# Patient Record
(demographics unavailable — no encounter records)

---

## 2024-10-23 NOTE — ASSESSMENT
[FreeTextEntry1] : EXAM Right wrist with mild swelling; no erythema; no ecchymosis. Mild ttp at dorsal wrist. Able to make a composite fist, oppose thumb to small finger and abduct fingers. <2sec cap refill.  Right wrist radiographs with no fracture nor dislocation. Carpus alignment intact. (3-view)  ASSESSMENT & PLAN Right wrist sprain vs contusion - reviewed radiographs and pathoanatomy with the patient. Discussed management to consist of NSAIDs prn, wrist brace prn, elevation, minimize use.   F/u prn
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
## 2025-02-26 NOTE — ASSESSMENT
[FreeTextEntry1] : EXAM Right wrist with mild swelling; no erythema; no ecchymosis. +ttp at radial styloid. Able to make a composite fist, oppose thumb to small finger and abduct fingers. <2sec cap refill.  Right wrist radiographs with no fracture nor dislocation. Carpus alignment intact. (3-view)  ASSESSMENT & PLAN Right wrist sprain vs contusion with resultant DeQuervain's - reviewed radiographs and pathoanatomy with the patient. Discussed management to consist of NSAIDs prn, wrist brace prn, elevation, minimize use. OT for ROM  F/u 6weeks